# Patient Record
Sex: FEMALE | Race: BLACK OR AFRICAN AMERICAN | NOT HISPANIC OR LATINO | URBAN - METROPOLITAN AREA
[De-identification: names, ages, dates, MRNs, and addresses within clinical notes are randomized per-mention and may not be internally consistent; named-entity substitution may affect disease eponyms.]

---

## 2017-02-01 ENCOUNTER — EMERGENCY (EMERGENCY)
Facility: HOSPITAL | Age: 32
LOS: 1 days | Discharge: HOME | End: 2017-02-01
Attending: EMERGENCY MEDICINE | Admitting: EMERGENCY MEDICINE
Payer: MEDICAID

## 2017-02-01 VITALS
DIASTOLIC BLOOD PRESSURE: 95 MMHG | TEMPERATURE: 98 F | HEART RATE: 86 BPM | SYSTOLIC BLOOD PRESSURE: 141 MMHG | RESPIRATION RATE: 18 BRPM | OXYGEN SATURATION: 100 %

## 2017-02-01 LAB
APPEARANCE UR: CLEAR — SIGNIFICANT CHANGE UP
BACTERIA # UR AUTO: SIGNIFICANT CHANGE UP
BILIRUB UR-MCNC: NEGATIVE — SIGNIFICANT CHANGE UP
BLOOD UR QL VISUAL: HIGH
COLOR SPEC: SIGNIFICANT CHANGE UP
GLUCOSE UR-MCNC: NEGATIVE — SIGNIFICANT CHANGE UP
KETONES UR-MCNC: NEGATIVE — SIGNIFICANT CHANGE UP
LEUKOCYTE ESTERASE UR-ACNC: NEGATIVE — SIGNIFICANT CHANGE UP
MUCOUS THREADS # UR AUTO: SIGNIFICANT CHANGE UP
NITRITE UR-MCNC: NEGATIVE — SIGNIFICANT CHANGE UP
PH UR: 6 — SIGNIFICANT CHANGE UP (ref 4.6–8)
PROT UR-MCNC: NEGATIVE — SIGNIFICANT CHANGE UP
RBC CASTS # UR COMP ASSIST: SIGNIFICANT CHANGE UP (ref 0–?)
SP GR SPEC: 1.01 — SIGNIFICANT CHANGE UP (ref 1–1.03)
SQUAMOUS # UR AUTO: SIGNIFICANT CHANGE UP
UROBILINOGEN FLD QL: NORMAL E.U. — SIGNIFICANT CHANGE UP (ref 0.1–0.2)
WBC UR QL: SIGNIFICANT CHANGE UP (ref 0–?)

## 2017-02-01 PROCEDURE — 99284 EMERGENCY DEPT VISIT MOD MDM: CPT

## 2017-02-01 NOTE — ED ADULT TRIAGE NOTE - CHIEF COMPLAINT QUOTE
Pt c/o vaginal bleeding x 4 days, states that her LMP was 1/12 and she started having vaginal discharge that looked like dead skin, followed by vaginal bleeding that started as spotting and became heavier over time. Pt also c/o LLQ pain. Home pregnancy test negative 3 days ago

## 2017-02-01 NOTE — ED PROVIDER NOTE - PLAN OF CARE
Please follow up with your OB/Gyn in 2-3 days or you can call the clinic at [phone]. You do not have a UTI.  Take motrin or alleve for cramping.  If you develop heavy vaginal bleeding (over 2 pads an hour for 2 hours), lightheadedness, severe abdominal pain, fever, or any other concern, please return to the ER for further evaluation.

## 2017-02-01 NOTE — ED PROVIDER NOTE - MEDICAL DECISION MAKING DETAILS
31F with vaginal bleeding and abd cramping.  Abdomen soft.  MInimal bleeding.  UCG negative, UA negative.  May be related to the vaginal detox vs dysfunctional bleeding.  Will f/u w gyn.

## 2017-02-01 NOTE — ED PROVIDER NOTE - OBJECTIVE STATEMENT
31F h/o uterine fibroids presents with vaginal bleeding.  LMP 1/15, typically is regular.  Last week used some herbal intravaginal detox pearls.  States she used as directed, inserted the first for 24 hours and the second for 48 hours, finished last week.  Started to note dark vaginal spotting yesterday, becoming more heavy today.   Took a pregnancy test at home that was negative yesterday.  Reports some lower abdominal cramping, like menstrual cramping.  + urinary frequency, no dysuria.  No vaginal discharge.  No vaginal pain.  No fever, no n/v/d.